# Patient Record
Sex: MALE | Race: WHITE | URBAN - METROPOLITAN AREA
[De-identification: names, ages, dates, MRNs, and addresses within clinical notes are randomized per-mention and may not be internally consistent; named-entity substitution may affect disease eponyms.]

---

## 2017-01-24 ENCOUNTER — OFFICE VISIT (OUTPATIENT)
Dept: INTERNAL MEDICINE CLINIC | Facility: CLINIC | Age: 52
End: 2017-01-24

## 2017-01-24 VITALS
SYSTOLIC BLOOD PRESSURE: 138 MMHG | HEART RATE: 68 BPM | WEIGHT: 209 LBS | DIASTOLIC BLOOD PRESSURE: 88 MMHG | TEMPERATURE: 98 F | BODY MASS INDEX: 32 KG/M2 | RESPIRATION RATE: 16 BRPM

## 2017-01-24 DIAGNOSIS — R01.1 MURMUR, CARDIAC: ICD-10-CM

## 2017-01-24 DIAGNOSIS — J06.9 ACUTE URI: Primary | ICD-10-CM

## 2017-01-24 DIAGNOSIS — M54.2 NECK PAIN: ICD-10-CM

## 2017-01-24 PROCEDURE — 99214 OFFICE O/P EST MOD 30 MIN: CPT | Performed by: NURSE PRACTITIONER

## 2017-01-24 RX ORDER — AMOXICILLIN AND CLAVULANATE POTASSIUM 875; 125 MG/1; MG/1
1 TABLET, FILM COATED ORAL 2 TIMES DAILY
Qty: 20 TABLET | Refills: 0 | Status: SHIPPED | OUTPATIENT
Start: 2017-01-24 | End: 2017-02-03

## 2017-01-24 RX ORDER — CODEINE PHOSPHATE AND GUAIFENESIN 10; 100 MG/5ML; MG/5ML
5 SOLUTION ORAL NIGHTLY PRN
Qty: 118 ML | Refills: 0 | Status: SHIPPED | OUTPATIENT
Start: 2017-01-24 | End: 2021-06-14

## 2017-01-24 RX ORDER — CYCLOBENZAPRINE HCL 10 MG
10 TABLET ORAL 3 TIMES DAILY PRN
Qty: 15 TABLET | Refills: 0 | Status: SHIPPED | OUTPATIENT
Start: 2017-01-24 | End: 2017-02-07

## 2017-01-24 NOTE — PROGRESS NOTES
Patient presents with:  Sinus Problem: patient here with complaints of ongoing sinus issues, frequent traveler. Also with viral illness this past weekend.   Present with cough that is productive at times, has been trying mucinex without relief  Neck Pain: Rfl: 0       Physical Exam  /88 mmHg  Pulse 68  Temp(Src) 97.9 °F (36.6 °C) (Oral)  Resp 16  Wt 209 lb  Constitutional:  No distress. HEENT:  Normocephalic and atraumatic.  Tympanic membranes normal. Oropharynx is mildly erythematous without exudate Nasal wash squeeze bottle, Neti-Pot, Neilmed nasal wash or similar device 3-4 times daily. These are all available over the counter. Follow manufacturers instructions.   I prefer the \"squeeze bottle\" variety over the \"teapot\" style, as it allows for bet

## 2017-01-24 NOTE — PATIENT INSTRUCTIONS
Gargle with warm salt water solution 3 times daily. Dissolve 1/2 teaspoon salt in half cup of warm tap water. Gargle and spit. Use a humidifier in your room at night.      I highly recommend using a saline nasal wash device such as SinuCleanse Nasal was

## 2017-11-20 NOTE — PROGRESS NOTES
Patient is over due for colonoscopy or does not have records in [address] [place] [address] Rd. Has he had one, if so we need records. If not I can place referral for him. Please let me know. Thanks.

## 2017-11-22 NOTE — PROGRESS NOTES
[place] to schedule an appt to further discuss. Doesn't want info at this time. Thankful for the call.

## 2021-06-14 ENCOUNTER — HOSPITAL ENCOUNTER (OUTPATIENT)
Age: 56
Discharge: HOME | End: 2021-06-14
Attending: EMERGENCY MEDICINE
Payer: COMMERCIAL

## 2021-06-14 VITALS
HEART RATE: 93 BPM | RESPIRATION RATE: 16 BRPM | DIASTOLIC BLOOD PRESSURE: 118 MMHG | TEMPERATURE: 99 F | SYSTOLIC BLOOD PRESSURE: 214 MMHG | OXYGEN SATURATION: 99 %

## 2021-06-14 DIAGNOSIS — T22.212A PARTIAL THICKNESS BURN OF LEFT FOREARM, INITIAL ENCOUNTER: ICD-10-CM

## 2021-06-14 DIAGNOSIS — I10 ESSENTIAL HYPERTENSION: ICD-10-CM

## 2021-06-14 DIAGNOSIS — T20.10XA SUPERFICIAL BURN OF FACE, INITIAL ENCOUNTER: Primary | ICD-10-CM

## 2021-06-14 PROCEDURE — 16020 DRESS/DEBRID P-THICK BURN S: CPT

## 2021-06-14 PROCEDURE — 99203 OFFICE O/P NEW LOW 30 MIN: CPT

## 2021-06-14 PROCEDURE — 99204 OFFICE O/P NEW MOD 45 MIN: CPT

## 2021-06-14 PROCEDURE — 90471 IMMUNIZATION ADMIN: CPT

## 2021-06-14 RX ORDER — AMLODIPINE BESYLATE 5 MG/1
5 TABLET ORAL DAILY
Qty: 30 TABLET | Refills: 0 | Status: SHIPPED | OUTPATIENT
Start: 2021-06-14
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
## (undated) NOTE — MR AVS SNAPSHOT
[facility] [address] [address] [phone]               Thank you for choosing us for your health care visit with Robert Penny NP.   We are glad to serve you and happy to provide you with this summary Take all antibiotics as prescribed. Do not stop taking them, even if you feel better.                   Allergies as of Jan 24, 2017     No Known Allergies                Today's Vital Signs     BP Pulse Temp Weight          138/88 mmHg 68 97.9 °F (36.6 °C) Eat plenty of low-fat dairy products High fat meats and dairy   Choose whole grain products Foods high in sodium   Water is best for hydration Fast food.    Eat at home when possible     Tips for increasing your physical activity – Adults who are physically